# Patient Record
Sex: FEMALE | Race: WHITE | Employment: STUDENT | URBAN - METROPOLITAN AREA
[De-identification: names, ages, dates, MRNs, and addresses within clinical notes are randomized per-mention and may not be internally consistent; named-entity substitution may affect disease eponyms.]

---
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
## 2022-03-04 ENCOUNTER — OFFICE VISIT (OUTPATIENT)
Dept: FAMILY MEDICINE | Facility: CLINIC | Age: 22
End: 2022-03-04

## 2022-03-04 VITALS
OXYGEN SATURATION: 99 % | BODY MASS INDEX: 21.26 KG/M2 | WEIGHT: 120 LBS | TEMPERATURE: 97.6 F | SYSTOLIC BLOOD PRESSURE: 118 MMHG | DIASTOLIC BLOOD PRESSURE: 78 MMHG | HEIGHT: 63 IN | HEART RATE: 106 BPM

## 2022-03-04 DIAGNOSIS — Z71.84 TRAVEL ADVICE ENCOUNTER: ICD-10-CM

## 2022-03-04 DIAGNOSIS — Z02.5 SPORTS PHYSICAL: ICD-10-CM

## 2022-03-04 DIAGNOSIS — Z00.00 ROUTINE GENERAL MEDICAL EXAMINATION AT A HEALTH CARE FACILITY: Primary | ICD-10-CM

## 2022-03-04 DIAGNOSIS — Z76.89 HEALTH CARE HOME: ICD-10-CM

## 2022-03-04 DIAGNOSIS — Z71.89 ACP (ADVANCE CARE PLANNING): ICD-10-CM

## 2022-03-04 PROCEDURE — 99173 VISUAL ACUITY SCREEN: CPT | Performed by: STUDENT IN AN ORGANIZED HEALTH CARE EDUCATION/TRAINING PROGRAM

## 2022-03-04 PROCEDURE — 99395 PREV VISIT EST AGE 18-39: CPT | Performed by: STUDENT IN AN ORGANIZED HEALTH CARE EDUCATION/TRAINING PROGRAM

## 2022-03-04 PROCEDURE — 92551 PURE TONE HEARING TEST AIR: CPT | Performed by: STUDENT IN AN ORGANIZED HEALTH CARE EDUCATION/TRAINING PROGRAM

## 2022-03-04 NOTE — PATIENT INSTRUCTIONS
Can do Covid antibody test to prove prior infection if [facility] will accept. Drawn at [facility] outpatient lab.       Preventive Health Recommendations  Female Ages 18 to 20     Yearly exam:     See your health care provider every year in order to  o Review health changes.   o Discuss preventive care.    o Review your medicines if your doctor has prescribed any.      You should be tested each year for STDs (sexually transmitted diseases).       After age 20, talk to your provider about how often you should have cholesterol testing.      If you are at risk for diabetes, you should have a diabetes test (fasting glucose).     Shots:     Get a flu shot each year.     Get a tetanus shot every 10 years.     Consider getting the shot (vaccine) that prevents cervical cancer (Gardasil).    Nutrition:     Eat at least 5 servings of fruits and vegetables each day.    Eat whole-grain bread, whole-wheat pasta and brown rice instead of white grains and rice.    Get adequate Calcium and Vitamin D.     Lifestyle    Exercise at least 150 minutes a week each week (30 minutes a day, 5 days a week). This will help you control your weight and prevent disease.    No smoking.     Wear sunscreen to prevent skin cancer.    See your dentist every six months for an exam and cleaning.

## 2022-03-04 NOTE — PROGRESS NOTES
SUBJECTIVE:   CC: Rosemary Christianson is an 21 year old woman who presents for preventive health visit.     [facility]     Sports Physical:  SPORTS QUESTIONNAIRE:  ======================   School: [employer]                          Grade: Freshman                   Sports: Soccer  1.  no - Do you have any concerns that you would like to discuss with your provider?  2.  no - Has a provider ever denied or restricted your participation in sports for any reason?  3.  no - Do you have any ongoing medical issues or recent illness?  4.  no - Have you ever passed out or nearly passed out during or after exercise?   5.  no - Have you ever had discomfort, pain, tightness, or pressure in your chest during exercise?  6.  no - Does your heart ever race, flutter in your chest, or skip beats (irregular beats) during exercise?   7.  no - Has a doctor ever told you that you have any heart problems?  8.  no - Has a doctor ever ordered a test for your heart? For example, electrocardiography (ECG) or echocardiolography (ECHO)?  9.  no - Do you get lightheaded or feel shorter of breath than your friends during exercise?   10.  no - Have you ever had seizure?   11.  no - Has any family member or relative  of heart problems or had an unexpected or unexplained sudden death before age 35 years (including drowning or unexplained car crash)?  12.  no - Does anyone in your family have a genetic heart problem such as hypertrophic cardiomyopathy (HCM), Marfan Syndrome, arrhythmogenic right ventricular cardiomyopathy (ARVC), long QT syndrome (LQTS), short QT syndrome (SQTS), Brugada syndrome, or catecholaminergic polymorphic ventricular tachycardia (CPVT)?    13.  no - Has anyone in your family had a pacemaker, or implanted defibrillator before age 35?   14.  YES - Have you ever had a stress fracture or an injury to a bone, muscle, ligament, joint or tendon that caused you to miss a practice or game? Left foot stress fracture, no other concerns  15.   YES - Do you have a bone, muscle, ligament, or joint injury that bothers you?   16.  no - Do you cough, wheeze, or have difficulty breathing during or after exercise?    17.  no -  Are you missing a kidney, an eye, a testicle (males), your spleen, or any other organ?  18.  no - Do you have groin or testicle pain or a painful bulge or hernia in the groin area?  19.  no - Do you have any recurring skin rashes or rashes that come and go, including herpes or methicillin-resistant Staphylococcus aureus (MRSA)?  20.  YES - Have you had a concussion or head injury that caused confusion, a prolonged headache, or memory problems? Multiple prior concussions, reports always full recovery  21. no - Have you ever had numbness, tingling or weakness in your arms or legs or been unable to move your arms or legs after being hit or falling?   22.  no - Have you ever become ill while exercising in the heat?  23.  no - Do you or does someone in your family have sickle cell trait or disease?   24.  no - Have you ever had, or do you have any problems with your eyes or vision?  25.  no - Do you worry about your weight?    26.  no -  Are you trying to or has anyone recommended that you gain or lose weight?    27.  no -  Are you on a special diet or do you avoid certain types of foods or food groups?  28.  no - Have you ever had an eating disorder?   29. YES - Have you ever had a menstrual period?  30.  How old were you when you had your first menstrual period? 15   31.  When was your most recent  menstrual period? Irregular since IUD but no hx of amenorrhea     Working with PT after ACLR and second surgery for scar tissue, final clearance per ortho surgeon.     VISION    Corrective lenses: No corrective lenses (H Plus Lens Screening required)  Tool used: Daniel  Right eye: 10/8 (20/16)  Left eye: 10/8 (20/16)  Two Line Difference: No  Visual Acuity: Pass  H Plus Lens Screening: Pass    Vision Assessment: normal      HEARING   Right Ear:       1000 Hz RESPONSE- on Level:   20 db  (Conditioning sound)   1000 Hz: RESPONSE- on Level:   20 db    2000 Hz: RESPONSE- on Level:   20 db    4000 Hz: RESPONSE- on Level:   20 db    6000 Hz: RESPONSE- on Level:   20 db     Left Ear:      6000 Hz: RESPONSE- on Level:   20 db    4000 Hz: RESPONSE- on Level:   20 db    2000 Hz: RESPONSE- on Level:   20 db    1000 Hz: RESPONSE- on Level:   20 db      500 Hz: RESPONSE- on Level: 25 db    Right Ear:       500 Hz: RESPONSE- on Level: 25 db    Hearing Acuity: Pass    Hearing Assessment: normal     General health: no concerns, overall healthy  Sleep: no concerns   Mental Health: no concerns      Immunizations: had rash after first IM flu shot 2017 (previously had intranasal through her school) and possibly feeling some chest tightness. No formal eval, took antihistamines and sx resolved. Avoided flu shots since then.   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       Today's PHQ-2 Score:   PHQ-2 ( 1999 Pfizer) 3/4/2022   Q1: Little interest or pleasure in doing things 0   Q2: Feeling down, depressed or hopeless 0   PHQ-2 Score 0     Do you feel safe in your environment? Yes    Social History     Tobacco Use     Smoking status: Never Smoker     Smokeless tobacco: Never Used   Substance Use Topics     Alcohol use: No     If you drink alcohol do you typically have >3 drinks per day or >7 drinks per week? No    Reviewed orders with patient.  Reviewed health maintenance and updated orders accordingly - Yes  Labs reviewed in EPIC    Breast Cancer Screening: Not high risk    History of abnormal Pap smear: NO - under age 21, PAP not appropriate for age  IUD last year, Mirena, no concerns  Had STD screening one month ago no concerns or new exposures     Reviewed and updated as needed this visit by clinical staff   Tobacco  Allergies  Meds  Problems             Reviewed and updated as needed this visit by Provider            "        Review of Systems  12 point ROS performed and negative for new concerns except as mentioned above      OBJECTIVE:   /78 (BP Location: Left arm, Patient Position: Sitting, Cuff Size: Adult Regular)   Pulse 106   Temp 97.6  F (36.4  C) (Temporal)   Ht 1.6 m (5' 3\")   Wt 54.4 kg (120 lb)   LMP 02/18/2022   SpO2 99%   BMI 21.26 kg/m    Physical Exam  GENERAL: healthy, alert and no distress  EYES: Eyes grossly normal to inspection, PERRL and conjunctivae and sclerae normal  HENT: ear canals and TM's normal, nose and mouth without ulcers or lesions  NECK: no adenopathy, no asymmetry, masses, or scars and thyroid normal to palpation  RESP: lungs clear to auscultation - no rales, rhonchi or wheezes  BREAST: normal without masses, tenderness or nipple discharge and no palpable axillary masses or adenopathy  CV: regular rate and rhythm, normal S1 S2, no S3 or S4, no murmur, click or rub, no peripheral edema and peripheral pulses strong  ABDOMEN: soft, nontender, no hepatosplenomegaly, no masses and bowel sounds normal  MS: no gross musculoskeletal defects noted, no edema  SKIN: no suspicious lesions or rashes  NEURO: Normal strength and tone, mentation intact and speech normal  PSYCH: mentation appears normal, affect normal/bright    Diagnostic Test Results:  Prior labs reviewed in Epic    ASSESSMENT/PLAN:       ICD-10-CM    1. Routine general medical examination at a health care facility  Z00.00    2. Health Care Home  Z76.89    3. ACP (advance care planning)  Z71.89    4. Sports physical  Z02.5 SCREENING, VISUAL ACUITY, QUANTITATIVE, BILAT     PURE TONE HEARING TEST, AIR   5. Travel advice encounter  Z71.84 SARS-CoV-2 Nucleocapsid Total Ab     Patient is medically cleared for college athletics, though continues to recover from recent knee surgeries and level of sport participation will be guided by her orthopedic surgeon.     Unfortunately patient is traveling internationally in less than 14 days and " "proof of Covid vaccination or recent infection required. Patient not immunized due to reaction to previous flu shot, however we reviewed this is not a contraindication to covid vaccine. I did offer antibody test as marker of immunity but advised this may not be covered by insurance and also many not be accepted by customs-she will check into this option. Also encouraged her to consider covid vaccination at [facility] clinic hub site where nursing staff would be present for supervision for any potential reaction.     COUNSELING:  Reviewed preventive health counseling, as reflected in patient instructions       Regular exercise       Healthy diet/nutrition       Immunizations       Alcohol Use       Contraception       Safe sex practices/STD prevention    Estimated body mass index is 21.26 kg/m  as calculated from the following:    Height as of this encounter: 1.6 m (5' 3\").    Weight as of this encounter: 54.4 kg (120 lb).    She reports that she has never smoked. She has never used smokeless tobacco.      Emeterio Mckeon MD, [facility]   "

## 2022-03-04 NOTE — NURSING NOTE
Chief Complaint   Patient presents with     Physical     non-fasting today      Immunization     discuss covid vaccine     Establish Care     Pre-visit Screening:  Immunizations:  up to date  Colonoscopy:  NA  Mammogram: NA  Asthma Action Test/Plan: exercise induced   PHQ9:  NA  GAD7:  NA  Questioned patient about current smoking habits Pt. has never smoked.  Ok to leave detailed message on voice mail for today's visit only Yes, phone # [phone]

## 2024-06-17 PROBLEM — Z76.89 HEALTH CARE HOME: Status: RESOLVED | Noted: 2022-03-04 | Resolved: 2024-06-17
